# Patient Record
(demographics unavailable — no encounter records)

---

## 2024-10-14 NOTE — REASON FOR VISIT
[Home] : at home, [unfilled] , at the time of the visit. [Medical Office: ([facility])___] : at the medical office located in  [Patient] : the patient [Follow-Up: _____] : a [unfilled] follow-up visit

## 2024-10-14 NOTE — REASON FOR VISIT
[Home] : at home, [unfilled] , at the time of the visit. [Medical Office: ([facility])___] : at the medical office located in  [Patient] : the patient [Follow-Up: _____] : a [unfilled] follow-up visit

## 2024-10-14 NOTE — REASON FOR VISIT
[Home] : at home, [unfilled] , at the time of the visit. [Medical Office: ([facility])___] : at the medical office located in  [Patient] : the patient [Follow-Up: _____] : a [unfilled] follow-up visit

## 2024-10-18 NOTE — ASSESSMENT
[FreeTextEntry1] : Patient is known to me. He underwent hiatal hernia repair with Nissen fundoplication for a hiatal hernia and history of Liriano Esophagus. Surgery was on 4/24/23. He returns in follow up.   Overall, he is doing very well. He has no issues with reflux, or GI issues at all for that matter. He is very happy with his results.  He will be moving to [place] this spring. He will touch base with the office prior to leaving if there are any issues. Otherwise, we will help in getting any paperwork and records needed to his new doctors. He understood and agreed.  PLAN 1.  MICHELLE KHANNA, Dr. Dean, personally performed the evaluation and management (E/M) services for this established patient who presents today with (a) new problem(s)/exacerbation of (an) existing condition(s). That E/M includes conducting the clinically appropriate interval history &/or exam, assessing all new/exacerbated conditions, and establishing a new plan of care.

## 2024-10-18 NOTE — HISTORY OF PRESENT ILLNESS
[FreeTextEntry1] : 65 y/o male with a PMHx of Liriano's esophagus dx in 2018. Recent EGD and x-ray esophagram found a moderate hiatal hernia. He is now s/p elective hiatal hernia repair with Nissen fundoplication on 4/24/23. At his last post op visit, he was recovering well and was able to eat everything he would like w/o symptoms. He is still taking his omeprazole and is being managed by Dr. Duran.   He presents today for a one year follow up for reassessment.

## 2024-10-18 NOTE — HISTORY OF PRESENT ILLNESS
[FreeTextEntry1] : 67 y/o male with a PMHx of Liriano's esophagus dx in 2018. Recent EGD and x-ray esophagram found a moderate hiatal hernia. He is now s/p elective hiatal hernia repair with Nissen fundoplication on 4/24/23. At his last post op visit, he was recovering well and was able to eat everything he would like w/o symptoms. He is still taking his omeprazole and is being managed by Dr. Duran.   He presents today for a one year follow up for reassessment.